# Patient Record
Sex: FEMALE | Race: BLACK OR AFRICAN AMERICAN | NOT HISPANIC OR LATINO | Employment: UNEMPLOYED | URBAN - METROPOLITAN AREA
[De-identification: names, ages, dates, MRNs, and addresses within clinical notes are randomized per-mention and may not be internally consistent; named-entity substitution may affect disease eponyms.]

---

## 2021-10-20 RX ORDER — CLONAZEPAM 0.5 MG/1
TABLET ORAL
COMMUNITY
Start: 2021-10-07 | End: 2022-04-21

## 2021-10-20 RX ORDER — MIRTAZAPINE 15 MG/1
TABLET, FILM COATED ORAL
COMMUNITY
Start: 2021-10-07 | End: 2022-04-21

## 2021-10-20 RX ORDER — TRAMADOL HYDROCHLORIDE 50 MG/1
TABLET ORAL
COMMUNITY
Start: 2021-10-13 | End: 2022-04-21

## 2021-10-20 RX ORDER — ONDANSETRON 4 MG/1
4 TABLET, ORALLY DISINTEGRATING ORAL EVERY 8 HOURS PRN
COMMUNITY
End: 2022-04-21

## 2021-10-20 RX ORDER — FLUOXETINE HYDROCHLORIDE 20 MG/1
20 CAPSULE ORAL DAILY
COMMUNITY
Start: 2021-08-23 | End: 2022-04-21

## 2021-10-20 RX ORDER — ZIPRASIDONE HYDROCHLORIDE 80 MG/1
80 CAPSULE ORAL NIGHTLY
COMMUNITY
Start: 2021-08-23 | End: 2022-04-21

## 2021-10-20 RX ORDER — TRAZODONE HYDROCHLORIDE 100 MG/1
100 TABLET ORAL NIGHTLY
COMMUNITY
Start: 2021-08-23 | End: 2022-04-21

## 2021-10-20 RX ORDER — OLANZAPINE 5 MG/1
TABLET ORAL
COMMUNITY
Start: 2021-10-11 | End: 2022-04-21

## 2022-06-14 ENCOUNTER — CLINICAL SUPPORT (OUTPATIENT)
Dept: REHABILITATION | Facility: HOSPITAL | Age: 27
End: 2022-06-14
Attending: OBSTETRICS & GYNECOLOGY
Payer: MEDICAID

## 2022-06-14 DIAGNOSIS — R26.9 GAIT DIFFICULTY: ICD-10-CM

## 2022-06-14 DIAGNOSIS — Z34.90 PREGNANCY, UNSPECIFIED GESTATIONAL AGE: ICD-10-CM

## 2022-06-14 DIAGNOSIS — R26.89 DECREASED FUNCTIONAL MOBILITY: ICD-10-CM

## 2022-06-14 DIAGNOSIS — M25.552 LEFT HIP PAIN: ICD-10-CM

## 2022-06-14 DIAGNOSIS — M25.559 HIP PAIN: ICD-10-CM

## 2022-06-14 PROCEDURE — 97162 PT EVAL MOD COMPLEX 30 MIN: CPT

## 2022-06-14 PROCEDURE — 97110 THERAPEUTIC EXERCISES: CPT
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
## 2022-07-01 ENCOUNTER — CLINICAL SUPPORT (OUTPATIENT)
Dept: REHABILITATION | Facility: HOSPITAL | Age: 27
End: 2022-07-01
Attending: OBSTETRICS & GYNECOLOGY
Payer: MEDICAID

## 2022-07-01 DIAGNOSIS — M25.552 LEFT HIP PAIN: Primary | ICD-10-CM

## 2022-07-01 DIAGNOSIS — R26.9 GAIT DIFFICULTY: ICD-10-CM

## 2022-07-01 DIAGNOSIS — R26.89 DECREASED FUNCTIONAL MOBILITY: ICD-10-CM

## 2022-07-01 PROCEDURE — 97110 THERAPEUTIC EXERCISES: CPT

## 2022-07-01 NOTE — PROGRESS NOTES
Pelvic Health Physical Therapy   Treatment Note     Name: Aimee Santacruz  [facility] Number: [id number]    Therapy Diagnosis:   Encounter Diagnoses   Name Primary?    Left hip pain Yes    Gait difficulty     Decreased functional mobility      Physician: Jon Kinney*    Visit Date: 7/1/2022    Physician Orders: PT Eval and Treat   Medical Diagnosis: Pain in unspecified hip [M25.559], Encounter for supervision of normal pregnancy, unspecified, unspecified trimester [Z34.90]  Evaluation Date: 06/14/2022  Authorization Period Expiration: 12/31/2022  Plan of Care Expiration: 09/29/2022  Progress Note Due: 07/31/2022  Visit #/Visits Authorized: 1/12   Cancelled Visits: 1  No Show Visits: 0    FOTO: 1    Precautions: universal, preganancy    Time In: 4:40 pm (pt arrived late)  Time Out: 5:39 pm  Total Billable Time: 59 minutes    Subjective     Pt reports today: Has been hurting since eval. Original incident was June 4th. Pain is in groin B and pubic symphysis. Has been sleeping on couch due to pain.    She was compliant with home exercise program.  Response to previous treatment: N/A  Functional change: N/A    Pain Pre-Treatment: 0/10  Pain Post-Treatment: 0/10  Location: N/A    Constitutional Symptoms Review: The patient denies having any constitutional symptoms.     Objective      Bladder History: none per pt report  Frequency of urination:   Daytime: every 30-45 min since recently           Nighttime: 3x  Difficulty initiating urine stream: No  Urine stream: strong  Complete emptying: Yes  Urinary urgency: Yes, Able to delay the urge for at least 30 minute(s)  Bladder leakage: No  Frequency of incidents: N/A  Pain/Bleeding: No    Bowel History: none per pt report  Frequency of bowel movements: 3x per day since May, was going 2x per week prior to pregnancy  Difficulty initiating BM: No  Quality/Shape of BM: [place] Stool Chart 3 and 4  Complete emptying: Yes  Fecal urgency: No  Colon leakage: No  OTC  medication/supplementation? No  Pain/Bleeding: No    OB/GYN History: , vaginal delivery, pushing phase under 30 minutes and painful periods  Sexually active? No  Pain with vaginal exams, intercourse or tampon use? No  Heaviness? No    Pain:  Location: L groin, pubic symphysis  Description: sharp  Current 0/10, worst 7/10, best 0/10   Aggravating Factors/Activities that cause symptoms: prolonged standing, lifting leg to get dressed, anything where legs move too far apart, coughing, sneezing  Easing Factors: neutral positioning, rest  Activity Limitations: anything that involves spreading legs apart    ORTHO  Modified Trendelenburg: - R, + L  Pubic Symphysis Palpation: +  TEN: + for pain in pubic joint B  P4: - B  SI compression/distraction: - B  Palpation: tender to palpation of L adductors (origins) and L inguinal canal; non-tender to P/A of B SIJ      Aimee received the following manual therapy techniques: to develop desensitization for 10 minutes including:     STM to L adductor origins and inguinal canal  Kinesiotaping for decreased PGP/round ligament pain - 2 strips applied underneath belly for lifting support, 2 placed in X pattern from ASIS to inferior rib B      Aimee participated in neuromuscular re-education activities to develop Coordination, Control, Proprioception and Sense for 25 minutes including:     Performed assessment of PF coordination through symptom report      Aimee participated in dynamic functional therapeutic activities to improve functional performance for 24 minutes, including:   - Education as described below.   - Body mechanics training for decreased PGP    Home Exercises Provided and Patient Education Provided     Education Provided:   - behavior modifications  Discussed progression of plan of care with patient; educated pt in activity modification; reviewed HEP with pt. Pt demonstrated and verbalized understanding of all instruction and was provided with a handout of HEP  (see Patient Instructions).  - PGP (relaxin, etc.)  - SI belt, maybe try maternity belt  - Ice the area that is painful (inguinal canal)    Written Home Exercises Provided: yes.  Exercises were reviewed and Aimee was able to demonstrate them prior to the end of the session.  Aimee demonstrated good  understanding of the education provided.     See EMR under Patient Instructions for exercises provided 7/1/2022.    Assessment     Pt with good tolerance for treatment today. Pt presents with pelvic girdle pain related to pregnancy and increased pain in L groin due to potential strain of tendon. Pt with good response to manual therapy and taping today. Pt reports intent to attempt strategies learned in therapy today for decreased pain with functional mobility. Will assess impact at next visit and will continue to progress as tolerated per POC.    Aimee Is progressing well towards her goals.   Pt prognosis is Fair.    Pt will continue to benefit from skilled outpatient physical therapy to address the deficits listed in the problem list box on initial evaluation, provide pt/family education and to maximize pt's level of independence in the home and community environment.     Pt's spiritual, cultural and educational needs considered and pt agreeable to plan of care and goals.     Anticipated Barriers for therapy: pt is not working, has 3 children to intermittently care for, pt has limited finances    Goals:  Short Term Goals: In 4 weeks   - I with HEP. - NOT MET 07/01  - Pt to have pain less than 5/10 at all times. - NOT MET 07/01  - Pt to improve score on the FOTO by 10%. - NOT MET 07/01     Long Term Goals: In 10 weeks 8/23/22  - Patient to improve score on the FOTO to 20% limitation or better. - NOT MET 07/01  - Patient to have decreased pain to 3/10 or better at worst. - NOT MET 07/01  - Patient to demo increase single leg stand on the R to 8 seconds without L groin pain. - NOT MET 07/01    Plan     Continue per  established POC as tolerated.    Becky Key, PT, DPT

## 2022-07-01 NOTE — PLAN OF CARE
[facility]  Pelvic Health Physical Therapy Updated Plan of Care Note    Name: Aimee Santacruz  Clinic Number: [id number]    Therapy Diagnosis:   Encounter Diagnoses   Name Primary?    Left hip pain Yes    Gait difficulty     Decreased functional mobility      Physician: Jon Kinney*    Visit Date: 2022    Physician Orders: PT Eval and Treat   Medical Diagnosis: Pain in unspecified hip [M25.559], Encounter for supervision of normal pregnancy, unspecified, unspecified trimester [Z34.90]  Evaluation Date: 2022  Authorization Period Expiration: 2022  Plan of Care Expiration: 2022  Progress Note Due: 2022  Visit #/Visits Authorized:    Cancelled Visits: 1  No Show Visits: 0    FOTO: 1    Precautions: universal, preganancy  Functional Level Prior to Evaluation: pelvic girdle pain with ADLs    SUBJECTIVE     Update: Pt reports today: Has been hurting since eval. Original incident was . Pain is in groin B and pubic symphysis. Has been sleeping on couch due to pain.    OBJECTIVE     Update:   Bladder History: none per pt report  Frequency of urination:   Daytime: every 30-45 min since recently           Nighttime: 3x  Difficulty initiating urine stream: No  Urine stream: strong  Complete emptying: Yes  Urinary urgency: Yes, Able to delay the urge for at least 30 minute(s)  Bladder leakage: No  Frequency of incidents: N/A  Pain/Bleeding: No    Bowel History: none per pt report  Frequency of bowel movements: 3x per day since May, was going 2x per week prior to pregnancy  Difficulty initiating BM: No  Quality/Shape of BM: [place] Stool Chart 3 and 4  Complete emptying: Yes  Fecal urgency: No  Colon leakage: No  OTC medication/supplementation? No  Pain/Bleeding: No    OB/GYN History: , vaginal delivery, pushing phase under 30 minutes and painful periods  Sexually active? No  Pain with vaginal exams, intercourse or tampon use? No  Heaviness?  No    Pain:  Location: L groin, pubic symphysis  Description: sharp  Current 0/10, worst 7/10, best 0/10   Aggravating Factors/Activities that cause symptoms: prolonged standing, lifting leg to get dressed, anything where legs move too far apart, coughing, sneezing  Easing Factors: neutral positioning, rest  Activity Limitations: anything that involves spreading legs apart      ORTHO  Modified Trendelenburg: - R, + L  Pubic Symphysis Palpation: +  TEN: + for pain in pubic joint B  P4: - B  SI compression/distraction: - B  Palpation: tender to palpation of L adductors (origins) and L inguinal canal; non-tender to P/A of B SIJ    ASSESSMENT     Update: Pt with good tolerance for treatment today. Pt presents with pelvic girdle pain related to pregnancy and increased pain in L groin due to potential strain of tendon. Pt with good response to manual therapy and taping today. Pt reports intent to attempt strategies learned in therapy today for decreased pain with functional mobility. Will assess impact at next visit and will continue to progress as tolerated per POC.    Previous Short Term Goals Status: 0/0 met  New Short Term Goals Status: modified to include PFPT goals  Long Term Goal Status: modified: to include PFPT goals  Reasons for Recertification of Therapy: Transfer of pt to PFPT.    Goals:  Short Term Goals: In 4 weeks   - I with HEP. - NOT MET 07/01  - Pt to have pain less than 5/10 at all times. - NOT MET 07/01  - Pt to improve score on the FOTO by 10%. - NOT MET 07/01  - Pt to be educated on proper body mechanics and pressure management with lifting, bending, squatting, and functional mobility to decrease strain on lumbopelvic structures during pregnancy. - goal added 07/01     Long Term Goals: In 10 weeks 8/23/22  - Patient to improve score on the FOTO to 20% limitation or better. - NOT MET 07/01  - Patient to have decreased pain to 3/10 or better at worst. - NOT MET 07/01  - Patient to demo increase  single leg stand on the R to 8 seconds without L groin pain. - NOT MET 07/01  - Pt to demonstrate proper body mechanics and pressure management with lifting, bending, squatting, and functional mobility to decrease strain on lumbopelvic structures during pregnancy. - goal added 07/01  - Pt will report 90% confidence in her ability to have the best delivery possible for the health of her pelvic floor (compared to 0% at eval). - goal added 07/01    PLAN     Updated Certification Period: 07/01/2022 to 09/29/2022   Recommended Treatment Plan: 1-2 times per week for 12 weeks: therapeutic exercises, therapeutic activity, neuromuscular re-education, manual therapy, modalities PRN, patient/family education, dry needling and self care/home management  Other Recommendations: N/A    Becky Key, PT, DPT    I CERTIFY THE NEED FOR THESE SERVICES FURNISHED UNDER THIS PLAN OF TREATMENT AND WHILE UNDER MY CARE  Physician's comments:      Physician's Signature: ___________________________________________________

## 2022-07-01 NOTE — PATIENT INSTRUCTIONS
"SI belt - order on [employer] (make sure it fits and that it has elastic)  Try the maternity belt again  Ice your left groin (15 min)    Pelvic Girdle Pain Body Mechanics    With this, we are trying to change the way you move by reducing asymmetrical movements to decrease force through the joints and decrease pain.     Tips: pretend you are in a pencil skirt, pretend the thighs are zipped together/knees snapped together, legs doing "same thing at same time"    - In/out of car - sit down in the seat facing away from the car and then swing both legs in at the same time, keeping the knees together    - Bed mobility - keep knees together; see below    - Positioning in bed - in side lying, put one pillow between your thighs and two pillows between your calves, feet, and ankles to keep the hips in line. You may also place a rolled up hand towel underneath your belly to support it    - Rolling in bed - you roll over to your other side in the middle of the night, squeeze the pillow between your legs to help keep your knees together    - Walking - take shorter steps; pretend you are a penguin or are in a pencil skirt OR decide how many steps it would normally take to cross a distance and double it    - Stair climbing - go up sideways leading with less painful leg, making sure to hold on to rail or wall    - Sitting - weight primarily on sit bones, not rolled too far forward or backward; ensure proper foot support and back support (use a pillow in the back of the chair to make it shallower if the seat is too deep)    - Dressing - sit down to put on pants; try to step into the legs without raising leg too much OR use your arms to help lift your leg; ask for help if needed!    - Equal weight on both legs/avoiding SLS if painful    - Avoid pivoting/cutting (move your feet instead of twisting at your hips),    - Showering - sit on edge of tub and use a loofa with a handle to wash legs and feet OR get a shower chair. Avoid standing on " one leg!    Bed Mobility for Pelvic Girdle Pain    To get in bed:  1.) Walk to the bed and turn around so that your back is to the bed. Sit down on the edge of the bed.  2.) Lay your upper body down, bracing yourself with the elbow of the arm closest to the head of the bed and your other hand.   3.) Pull your legs into the bed, keeping the knees together and bent.  4.) Roll as a unit onto your back (hips and shoulders move at the same time, no twisting!).  5.) If you need to move closer to the middle of the bed, first bridge up, lifting your butt and shifting it over toward the middle of the bed and then set it down. Then move your shoulders and feet. Repeat this process until you have reached where you want to go.      To get out of bed:  1.) Use the bridging technique (see step 5 above) to work your way to the edge of the bed until you have just enough room to roll onto your side.  2.) Roll as a unit onto your side by reaching the arm closest to the middle of the bed across your chest and rolling your hips at the same time (hips and shoulders move at the same time - no twisting!).  3.) Let your legs come off the edge of the bed, keeping the knees together. This will allow gravity to assist you in sitting up.  4.) Push up with the elbow of the arm on bottom and the hand of the arm on top, and come to a sitting position.  5.) Stand up.    Remember, keep the knees together and avoid twisting the low back!

## 2022-08-11 ENCOUNTER — CLINICAL SUPPORT (OUTPATIENT)
Dept: REHABILITATION | Facility: HOSPITAL | Age: 27
End: 2022-08-11
Payer: MEDICAID

## 2022-08-11 DIAGNOSIS — M25.552 LEFT HIP PAIN: Primary | ICD-10-CM

## 2022-08-11 DIAGNOSIS — R26.9 GAIT DIFFICULTY: ICD-10-CM

## 2022-08-11 DIAGNOSIS — R26.89 DECREASED FUNCTIONAL MOBILITY: ICD-10-CM

## 2022-08-11 PROCEDURE — 97110 THERAPEUTIC EXERCISES: CPT

## 2022-08-11 NOTE — PROGRESS NOTES
"  Pelvic Health Physical Therapy   Treatment Note     Name: Aimee Santacruz  [facility] Number: [id number]    Therapy Diagnosis:   Encounter Diagnoses   Name Primary?    Left hip pain Yes    Gait difficulty     Decreased functional mobility      Physician: Jon Kinney*    Visit Date: 8/11/2022    Physician Orders: PT Eval and Treat   Medical Diagnosis: Pain in unspecified hip [M25.559], Encounter for supervision of normal pregnancy, unspecified, unspecified trimester [Z34.90]  Evaluation Date: 06/14/2022  Authorization Period Expiration: 12/31/2022  Plan of Care Expiration: 09/29/2022  Progress Note Due: 09/10/2022  Visit #/Visits Authorized: 2/12   Cancelled Visits: 2  No Show Visits: 0    FOTO: 1    Precautions: universal, preganancy    Time In: 12:05 pm   Time Out: 12:58 pm  Total Billable Time: 53 minutes    Subjective     Pt reports today: Has been continuing to hurt more. Can barely get in and out of bed and cannot lift legs well or move around in bed. Has been waddling at best for walking for about 2 weeks. Missed last week's visit because she went to the hospital instead due to pain. Pt reports she feels like her bottom is going to fall out. Reports she feels constipated even when she knows that she already has had a BM.   Has been having back pain, B groin pain (L is still worse), and at pubic symphysis (reports this is where she feels it falling completely "loose").   37 weeks today. Reports she has been having contractions since last night.   Reports the tape itched and did not help her pain. Reports she tried the maternity belt for a few minutes but did not think it helped. Did not try the SI belt.     She was compliant with home exercise program.  Response to previous treatment: N/A  Functional change: N/A    Pain Pre-Treatment: 8/10  Pain Post-Treatment: 8/10  Location: B groin (L > R), pubic symphysis, low back    Constitutional Symptoms Review: The patient denies having any constitutional " symptoms.     Objective     Pt-Reported Progress:  If you had to rate your progress (regarding all symptoms) on a scale from 0-100 with 0 being your status at eval and 100 being the best you will ever be, what number would you give?: 10%      Aimee received therapeutic exercises to develop  strength and core stabilization for 10 minutes including:     Glute sets x several reps  Abductor sets x several reps to tolerance      Aimee received the following manual therapy techniques: to develop desensitization for 0 minutes including:     Deferred:  STM to L adductor origins and inguinal canal  Kinesiotaping for decreased PGP/round ligament pain - 2 strips applied underneath belly for lifting support, 2 placed in X pattern from ASIS to inferior rib B      Aimee participated in neuromuscular re-education activities to develop Coordination, Control, Proprioception and Sense for 10 minutes including:     Diaphragmatic breathing for PF relaxation and awareness  TrA + kegel with exhale in sitting    Deferred:  Performed assessment of PF coordination through symptom report      Aimee participated in dynamic functional therapeutic activities to improve functional performance for 33 minutes, including:   - Education as described below.   - Body mechanics training for decreased PGP - reviewed extensively and discussed new strategies (see after visit summary)      Home Exercises Provided and Patient Education Provided     Education Provided:   - behavior modifications  Discussed progression of plan of care with patient; educated pt in activity modification; reviewed HEP with pt. Pt demonstrated and verbalized understanding of all instruction and was provided with a handout of HEP (see Patient Instructions).  Deferred:  - PGP (relaxin, etc.)  - SI belt, maybe try maternity belt  - Ice the area that is painful (inguinal canal)    Written Home Exercises Provided: yes.  Exercises were reviewed and Aimee was able to  demonstrate them prior to the end of the session.  Aimee demonstrated good  understanding of the education provided.     See EMR under Patient Instructions for exercises provided 08/12/2022.    Assessment     Pt with poor tolerance for treatment today. 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 Pt with no relief of pain since last visit partly due to poor adherence to HEP and partly due to severity of pain. Poor adherence to HEP may be linked to psychosocial issues such as limited access to financial resources and physical assistance at home. Primary goal is now to assist pt in completing last few weeks of pregnancy with as little pain as possible while still being able to complete ADLs, as she has minimal physical assistance at home and will have none starting this upcoming Sunday.     Aimee Is progressing well towards her goals.   Pt prognosis is Fair.    Pt will continue to benefit from skilled outpatient physical therapy to address the deficits listed in the problem list box on initial evaluation, provide pt/family education and to maximize pt's level of independence in the home and community environment.     Pt's spiritual, cultural and educational needs considered and pt agreeable to plan of care and goals.     Anticipated Barriers for therapy: pt is not working, has 3 children to intermittently care for, pt has limited finances    Goals:  Short Term Goals: In 4 weeks   - I with HEP. - PARTIALLY MET 08/12  - Pt to have pain less than 5/10 at all times. - NOT MET 08/12  - Pt to improve score on the FOTO by 10%. - NOT MET 08/12     Long Term Goals: In 10 weeks 8/23/22  - Patient to improve score on the FOTO to 20% limitation or better. - NOT MET 08/12  - Patient to have decreased pain to 3/10 or better at worst. - NOT MET 08/12  - Patient to demo increase single leg stand on the R to 8 seconds without L groin pain. - NOT MET 08/12    Plan      Continue per established POC as tolerated.    Becky Key, PT, DPT

## 2022-08-12 NOTE — PLAN OF CARE
"[facility]  Pelvic Health Physical Therapy Updated Plan of Care Note    Name: Aimee Santacruz  [facility] Number: [id number]    Therapy Diagnosis:   Encounter Diagnoses   Name Primary?    Left hip pain Yes    Gait difficulty     Decreased functional mobility      Physician: Jon Kinney*    Visit Date: 8/11/2022    Physician Orders: PT Eval and Treat   Medical Diagnosis: Pain in unspecified hip [M25.559], Encounter for supervision of normal pregnancy, unspecified, unspecified trimester [Z34.90]  Evaluation Date: 06/14/2022  Authorization Period Expiration: 12/31/2022  Plan of Care Expiration: 09/29/2022  Progress Note Due: 09/10/2022  Visit #/Visits Authorized: 2/12   Cancelled Visits: 2  No Show Visits: 0    FOTO: 1    Precautions: universal, preganancy  Functional Level Prior to Evaluation: pelvic girdle pain with ADLs    SUBJECTIVE     Update: Pt reports today: Has been continuing to hurt more. Can barely get in and out of bed and cannot lift legs well or move around in bed. Has been waddling at best for walking for about 2 weeks. Missed last week's visit because she went to the hospital instead due to pain. Pt reports she feels like her bottom is going to fall out. Reports she feels constipated even when she knows that she already has had a BM.   Has been having back pain, B groin pain (L is still worse), and at pubic symphysis (reports this is where she feels it falling completely "loose").   37 weeks today. Reports she has been having contractions since last night.   Reports the tape itched and did not help her pain. Reports she tried the maternity belt for a few minutes but did not think it helped. Did not try the SI belt.     OBJECTIVE     Update:     Pt-Reported Progress:  If you had to rate your progress (regarding all symptoms) on a scale from 0-100 with 0 being your status at eval and 100 being the best you will ever be, what number would you give?: 10%    ASSESSMENT "     Update: Pt with poor tolerance for treatment today. 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 Pt with no relief of pain since last visit partly due to poor adherence to HEP and partly due to severity of pain. Poor adherence to HEP may be linked to psychosocial issues such as limited access to financial resources and physical assistance at home. Primary goal is now to assist pt in completing last few weeks of pregnancy with as little pain as possible while still being able to complete ADLs, as she has minimal physical assistance at home and will have none starting this upcoming Sunday.     Previous Short Term Goals Status: 0/3 met  New Short Term Goals Status: N/A; continue unmet STGs  Long Term Goal Status: continue per initial plan of care.  Reasons for Recertification of Therapy: Pt is making progress with therapy but has not yet met her goals.    Goals:  Short Term Goals: In 4 weeks   - I with HEP. - PARTIALLY MET 08/12  - Pt to have pain less than 5/10 at all times. - NOT MET 08/12  - Pt to improve score on the FOTO by 10%. - NOT MET 08/12     Long Term Goals: In 10 weeks 8/23/22  - Patient to improve score on the FOTO to 20% limitation or better. - NOT MET 08/12  - Patient to have decreased pain to 3/10 or better at worst. - NOT MET 08/12  - Patient to demo increase single leg stand on the R to 8 seconds without L groin pain. - NOT MET 08/12    PLAN     Updated Certification Period: 08/11/2022 to 09/10/2022   Recommended Treatment Plan: 1-2 times per week for 4 weeks: therapeutic exercises, therapeutic activity, neuromuscular re-education, gait training, manual therapy, modalities PRN, patient/family education and self care/home management  Other Recommendations: N/A    Becky Key, PT, DPT    I CERTIFY THE NEED FOR THESE SERVICES FURNISHED UNDER THIS PLAN OF TREATMENT AND WHILE UNDER MY CARE  Physician's comments:      Physician's  Signature: ___________________________________________________

## 2022-08-18 ENCOUNTER — CLINICAL SUPPORT (OUTPATIENT)
Dept: REHABILITATION | Facility: HOSPITAL | Age: 27
End: 2022-08-18
Payer: MEDICAID

## 2022-08-18 DIAGNOSIS — M25.552 LEFT HIP PAIN: Primary | ICD-10-CM

## 2022-08-18 DIAGNOSIS — R26.89 DECREASED FUNCTIONAL MOBILITY: ICD-10-CM

## 2022-08-18 DIAGNOSIS — R26.9 GAIT DIFFICULTY: ICD-10-CM

## 2022-08-18 PROCEDURE — 97110 THERAPEUTIC EXERCISES: CPT

## 2022-08-18 NOTE — PLAN OF CARE
[facility]  Pelvic Floor Physical Therapy Discharge Note    Name: Aimee Santacruz  [facility] Number: [id number]    Therapy Diagnosis:   Encounter Diagnoses   Name Primary?    Left hip pain Yes    Gait difficulty     Decreased functional mobility      Physician: Jon Kinney*    Physician Orders: PT Eval and Treat   Medical Diagnosis: Pain in unspecified hip [M25.559], Encounter for supervision of normal pregnancy, unspecified, unspecified trimester [Z34.90]  Evaluation Date: 06/14/2022      Date of Last visit: 08/18/2022  Total Visits Received: 3 + 1 evaluation    ASSESSMENT      Pt with good tolerance for treatment today, experiencing less pain overall. Pt is likely to be induced next week and is therefore being discharged from PT with home program for maintenance and continued progress.     Discharge reason: Patient has reached the maximum rehab potential for the present time    Discharge FOTO Score: N/A    Goals:  Short Term Goals: In 4 weeks   - I with HEP. - MET 08/18  - Pt to have pain less than 5/10 at all times. - MET 08/18  - Pt to improve score on the FOTO by 10%. - NOT MET 08/18     Long Term Goals: In 10 weeks 8/23/22  - Patient to improve score on the FOTO to 20% limitation or better. - NOT MET 08/18  - Patient to have decreased pain to 3/10 or better at worst. - NOT MET 08/18  - Patient to demo increase single leg stand on the R to 8 seconds without L groin pain. - NOT MET 08/18    PLAN     This patient is discharged from PT.    Becky Key, PT, DPT

## 2022-08-18 NOTE — PROGRESS NOTES
Pelvic Health Physical Therapy   Treatment Note     Name: Aimee Santacruz  [facility] Number: [id number]    Therapy Diagnosis:   Encounter Diagnoses   Name Primary?    Left hip pain Yes    Gait difficulty     Decreased functional mobility      Physician: Jon Kinney*    Visit Date: 8/18/2022    Physician Orders: PT Eval and Treat   Medical Diagnosis: Pain in unspecified hip [M25.559], Encounter for supervision of normal pregnancy, unspecified, unspecified trimester [Z34.90]  Evaluation Date: 06/14/2022  Authorization Period Expiration: 12/31/2022  Plan of Care Expiration: 09/29/2022  Progress Note Due: 09/10/2022  Visit #/Visits Authorized: 3/12   Cancelled Visits: 2  No Show Visits: 0    FOTO: 1    Precautions: universal, preganancy    Time In: 12:05 pm   Time Out: 12:50 pm  Total Billable Time: 45 minutes    Subjective     Pt reports today: Has been staying at her boyfriend mom's house and she has been making her rest a lot. Reports her mother in law is making her do her exercises frequently. She has no pain today but is just having some pressure in the pelvic floor muscles. Reports she has to watch how she walks.   Pt is 38 weeks pregnant today. This is the longest she has carried. Thinks she is going to be induced, has appointment on Monday to potentially schedule induction. Has been having some swelling still but everything feels better if she rests.  Did not get to order SI belt.     She was compliant with home exercise program.  Response to previous treatment: N/A  Functional change: N/A    Pain Pre-Treatment: 0/10  Pain Post-Treatment: 0/10  Location: B groin (L > R), pubic symphysis, low back    Constitutional Symptoms Review: The patient denies having any constitutional symptoms.     Objective       Aimee received therapeutic exercises to develop  strength and core stabilization for 5 minutes including:     Glute sets x several reps    Deferred:  Abductor sets x several reps to  tolerance      Aimee received the following manual therapy techniques: to develop desensitization for 0 minutes including:     Deferred:  STM to L adductor origins and inguinal canal  Kinesiotaping for decreased PGP/round ligament pain - 2 strips applied underneath belly for lifting support, 2 placed in X pattern from ASIS to inferior rib B      Aimee participated in neuromuscular re-education activities to develop Coordination, Control, Proprioception and Sense for 15 minutes including:     Diaphragmatic breathing for PF relaxation and awareness  TrA + kegel with exhale in sitting    Deferred:  Performed assessment of PF coordination through symptom report      Aimee participated in dynamic functional therapeutic activities to improve functional performance for 25 minutes, including:   - Education as described below.   - Body mechanics training for decreased PGP - reviewed extensively and discussed new strategies (see after visit summary)      Home Exercises Provided and Patient Education Provided     Education Provided:   - behavior modifications  Discussed progression of plan of care with patient; educated pt in activity modification; reviewed HEP with pt. Pt demonstrated and verbalized understanding of all instruction and was provided with a handout of HEP (see Patient Instructions).  - Considerations for PFM during labor and delivery  - Can start transverse abdominus contractions, kegels, and belly breathing after birth  - Use body mechanics for a little while after birth  Deferred:  - PGP (relaxin, etc.)  - SI belt, maybe try maternity belt  - Ice the area that is painful (inguinal canal)    Written Home Exercises Provided: yes.  Exercises were reviewed and Aimee was able to demonstrate them prior to the end of the session.  Aimee demonstrated good  understanding of the education provided.     See EMR under Patient Instructions for exercises provided 08/18/2022.    Assessment     Pt with good  tolerance for treatment today, experiencing less pain overall. Pt is likely to be induced next week and is therefore being discharged from PT with home program for maintenance and continued progress.     Aimee Is progressing well towards her goals.   Pt prognosis is Fair.    Pt will continue to benefit from skilled outpatient physical therapy to address the deficits listed in the problem list box on initial evaluation, provide pt/family education and to maximize pt's level of independence in the home and community environment.     Pt's spiritual, cultural and educational needs considered and pt agreeable to plan of care and goals.     Anticipated Barriers for therapy: pt is not working, has 3 children to intermittently care for, pt has limited finances    Goals:  Short Term Goals: In 4 weeks   - I with HEP. - MET 08/18  - Pt to have pain less than 5/10 at all times. - MET 08/18  - Pt to improve score on the FOTO by 10%. - NOT MET 08/18     Long Term Goals: In 10 weeks 8/23/22  - Patient to improve score on the FOTO to 20% limitation or better. - NOT MET 08/18  - Patient to have decreased pain to 3/10 or better at worst. - NOT MET 08/18  - Patient to demo increase single leg stand on the R to 8 seconds without L groin pain. - NOT MET 08/18    Plan     Continue per established POC as tolerated.    Becky Key, PT, DPT

## 2022-08-18 NOTE — PATIENT INSTRUCTIONS
"Ice your left groin (15 min)    If having urinary urgency, do kegels to suppress urge    Things to Make Birth Go Smoother    1.) If lying on your back to push, place towel rolls under your butt bones to float the sacrum - it needs to be able to move backward as the baby comes through    2.) Pushing in sidelying or in all 4's position may decrease risk for tear - can do sidelying even with epidural    3.) Internal rotation of the hips (knees come in, feet go out) while pushing can increase space at pelvic outlet - with epidural can have partner, nurse, etc. hold or change position of the top leg with you in sidelying    4.) When pushing, blow out as your bear down and relax those pelvic floor muscles - they need to get out of the way, don't squeeze them (imagine you are pooping)      4 Things I Want You To Do Postpartum    1.) Diaphragmatic (belly) breathing  2.) Tummy squeezes with exhale  3.) Pelvic floor squeezes (kegels) with exhale  4.) Good body mechanics for a few months (see below)        Diaphragmatic Breathing      Take a deep breath in and expand your belly. Let yourself breathe in fully. Then let the air out (breathe out).        For both of these, do 3 sets of 10 per day, can space them throughout the day.    Abdominal Activation with Kegel    Blow out and squeeze your belly and do a kegel (squeeze the pelvic floor). Hold for 2-3 seconds. Then repeat 10x.      Butt Squeezes    Squeeze your butt cheeks 10x. Can also push your knees out against something as you do this.          Pelvic Girdle Pain Body Mechanics    With this, we are trying to change the way you move by reducing asymmetrical movements to decrease force through the joints and decrease pain.     Tips: pretend you are in a pencil skirt, pretend the thighs are zipped together/knees snapped together, legs doing "same thing at same time"    - In/out of car - sit down in the seat facing away from the car and then swing both legs in at the same " time, keeping the knees together    - Bed mobility - keep knees together; see below    - Positioning in bed - in side lying, put one pillow between your thighs and two pillows between your calves, feet, and ankles to keep the hips in line. You may also place a rolled up hand towel underneath your belly to support it    - Rolling in bed - you roll over to your other side in the middle of the night, squeeze the pillow between your legs to help keep your knees together    - Walking - take shorter steps; pretend you are a penguin or are in a pencil skirt OR decide how many steps it would normally take to cross a distance and double it    - Stair climbing - go up sideways leading with less painful leg, making sure to hold on to rail or wall    - Sitting - weight primarily on sit bones, not rolled too far forward or backward; ensure proper foot support and back support (use a pillow in the back of the chair to make it shallower if the seat is too deep)    - Dressing - sit down to put on pants; try to step into the legs without raising leg too much OR use your arms to help lift your leg; ask for help if needed!    - Equal weight on both legs/avoiding SLS if painful    - Avoid pivoting/cutting (move your feet instead of twisting at your hips),    - Showering - sit on edge of tub and use a loofa with a handle to wash legs and feet OR get a shower chair. Avoid standing on one leg!   - Use a shower bench. Sit on the seat, then try to gently get your legs into the tub. Then scoot your butt over further into the tub. Then stand up.      Bed Mobility for Pelvic Girdle Pain    To get in bed:  1.) Walk to the bed and turn around so that your back is to the bed. Sit down on the edge of the bed.  2.) Lay your upper body down, bracing yourself with the elbow of the arm closest to the head of the bed and your other hand.   3.) Pull your legs into the bed, keeping the knees together and bent.  4.) Roll as a unit onto your back (hips and  shoulders move at the same time, no twisting!).  5.) If you need to move closer to the middle of the bed, first bridge up, lifting your butt and shifting it over toward the middle of the bed and then set it down. Then move your shoulders and feet. Repeat this process until you have reached where you want to go.      To get out of bed:  1.) Use the bridging technique (see step 5 above) to work your way to the edge of the bed until you have just enough room to roll onto your side.  2.) Roll as a unit onto your side by reaching the arm closest to the middle of the bed across your chest and rolling your hips at the same time (hips and shoulders move at the same time - no twisting!).  3.) Let your legs come off the edge of the bed, keeping the knees together. This will allow gravity to assist you in sitting up.  4.) Push up with the elbow of the arm on bottom and the hand of the arm on top, and come to a sitting position.  5.) Stand up.    Remember, keep the knees together and avoid twisting the low back!

## 2023-06-13 ENCOUNTER — PATIENT MESSAGE (OUTPATIENT)
Dept: RESEARCH | Facility: HOSPITAL | Age: 28
End: 2023-06-13
Payer: MEDICAID

## 2023-06-20 ENCOUNTER — PATIENT MESSAGE (OUTPATIENT)
Dept: RESEARCH | Facility: HOSPITAL | Age: 28
End: 2023-06-20
Payer: MEDICAID

## 2023-07-05 ENCOUNTER — PATIENT MESSAGE (OUTPATIENT)
Dept: RESEARCH | Facility: HOSPITAL | Age: 28
End: 2023-07-05
Payer: MEDICAID

## 2023-07-11 ENCOUNTER — PATIENT MESSAGE (OUTPATIENT)
Dept: RESEARCH | Facility: HOSPITAL | Age: 28
End: 2023-07-11
Payer: MEDICAID

## 2023-07-19 ENCOUNTER — PATIENT MESSAGE (OUTPATIENT)
Dept: RESEARCH | Facility: HOSPITAL | Age: 28
End: 2023-07-19
Payer: MEDICAID

## 2023-07-24 ENCOUNTER — PATIENT MESSAGE (OUTPATIENT)
Dept: RESEARCH | Facility: HOSPITAL | Age: 28
End: 2023-07-24
Payer: MEDICAID

## 2025-04-06 NOTE — PATIENT INSTRUCTIONS
"SI belt - order on [employer] (make sure it fits and that it has elastic)  Try the maternity belt again  Ice your left groin (15 min)    Use the green band to help lift your legs  Get a shower bench    Pelvic Girdle Pain Body Mechanics    With this, we are trying to change the way you move by reducing asymmetrical movements to decrease force through the joints and decrease pain.     Tips: pretend you are in a pencil skirt, pretend the thighs are zipped together/knees snapped together, legs doing "same thing at same time"    - In/out of car - sit down in the seat facing away from the car and then swing both legs in at the same time, keeping the knees together    - Bed mobility - keep knees together; see below    - Positioning in bed - in side lying, put one pillow between your thighs and two pillows between your calves, feet, and ankles to keep the hips in line. You may also place a rolled up hand towel underneath your belly to support it    - Rolling in bed - you roll over to your other side in the middle of the night, squeeze the pillow between your legs to help keep your knees together    - Walking - take shorter steps; pretend you are a penguin or are in a pencil skirt OR decide how many steps it would normally take to cross a distance and double it    - Stair climbing - go up sideways leading with less painful leg, making sure to hold on to rail or wall    - Sitting - weight primarily on sit bones, not rolled too far forward or backward; ensure proper foot support and back support (use a pillow in the back of the chair to make it shallower if the seat is too deep)    - Dressing - sit down to put on pants; try to step into the legs without raising leg too much OR use your arms to help lift your leg; ask for help if needed!    - Equal weight on both legs/avoiding SLS if painful    - Avoid pivoting/cutting (move your feet instead of twisting at your hips),    - Showering - sit on edge of tub and use a loofa with a " handle to wash legs and feet OR get a shower chair. Avoid standing on one leg!   - Use a shower bench. Sit on the seat, then try to gently get your legs into the tub. Then scoot your butt over further into the tub. Then stand up.    Bed Mobility for Pelvic Girdle Pain    To get in bed:  1.) Walk to the bed and turn around so that your back is to the bed. Sit down on the edge of the bed.  2.) Lay your upper body down, bracing yourself with the elbow of the arm closest to the head of the bed and your other hand.   3.) Pull your legs into the bed, keeping the knees together and bent.  4.) Roll as a unit onto your back (hips and shoulders move at the same time, no twisting!).  5.) If you need to move closer to the middle of the bed, first bridge up, lifting your butt and shifting it over toward the middle of the bed and then set it down. Then move your shoulders and feet. Repeat this process until you have reached where you want to go.      To get out of bed:  1.) Use the bridging technique (see step 5 above) to work your way to the edge of the bed until you have just enough room to roll onto your side.  2.) Roll as a unit onto your side by reaching the arm closest to the middle of the bed across your chest and rolling your hips at the same time (hips and shoulders move at the same time - no twisting!).  3.) Let your legs come off the edge of the bed, keeping the knees together. This will allow gravity to assist you in sitting up.  4.) Push up with the elbow of the arm on bottom and the hand of the arm on top, and come to a sitting position.  5.) Stand up.    Remember, keep the knees together and avoid twisting the low back!    Diaphragmatic Breathing      Take a deep breath in and expand your belly. Let yourself breathe in fully. Then let the air out (breathe out        For both of these, do 3 sets of 10 per day, can space them throughout the day.    Abdominal Activation with Kegel    Blow out and squeeze your belly  and do a kegel (squeeze the pelvic floor). Hold for 2-3 seconds. Then repeat 10x.      Butt Squeezes    Squeeze your butt cheeks 10x. Can also push your knees out against something as you do this.       on the discharge service for the patient. I have reviewed and made amendments to the documentation where necessary.